# Patient Record
Sex: FEMALE | Race: WHITE | URBAN - METROPOLITAN AREA
[De-identification: names, ages, dates, MRNs, and addresses within clinical notes are randomized per-mention and may not be internally consistent; named-entity substitution may affect disease eponyms.]

---

## 2021-04-09 ENCOUNTER — WEB ENCOUNTER (OUTPATIENT)
Dept: URBAN - METROPOLITAN AREA CLINIC 96 | Facility: CLINIC | Age: 61
End: 2021-04-09

## 2021-04-09 ENCOUNTER — OFFICE VISIT (OUTPATIENT)
Dept: URBAN - METROPOLITAN AREA CLINIC 96 | Facility: CLINIC | Age: 61
End: 2021-04-09
Payer: COMMERCIAL

## 2021-04-09 DIAGNOSIS — R10.84 GENERALIZED ABDOMINAL PAIN: ICD-10-CM

## 2021-04-09 DIAGNOSIS — R07.89 CHEST DISCOMFORT: ICD-10-CM

## 2021-04-09 DIAGNOSIS — R12 HEARTBURN: ICD-10-CM

## 2021-04-09 DIAGNOSIS — R14.2 ERUCTATION: ICD-10-CM

## 2021-04-09 PROBLEM — 73595000: Status: ACTIVE | Noted: 2021-04-09

## 2021-04-09 PROCEDURE — 99244 OFF/OP CNSLTJ NEW/EST MOD 40: CPT | Performed by: INTERNAL MEDICINE

## 2021-04-09 RX ORDER — PANTOPRAZOLE SODIUM 40 MG/1
1 TABLET TABLET, DELAYED RELEASE ORAL ONCE A DAY
Status: ACTIVE | COMMUNITY

## 2021-04-09 RX ORDER — SODIUM, POTASSIUM,MAG SULFATES 17.5-3.13G
177 ML SOLUTION, RECONSTITUTED, ORAL ORAL AS DIRECTED
Qty: 1 BOX | Refills: 0 | OUTPATIENT
Start: 2021-04-09 | End: 2021-04-10

## 2021-04-09 NOTE — HPI-TODAY'S VISIT:
The patient was referred by Dr. Bartolo Burrell on [facility] for chest discomfort.   A copy of this document is being forwarded to the referring provider.  60 y.o. 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 Burrell about intermittent epigastric pain that comes and goes and radiates into ribs and to the back Bowels are fine No blood in stool Wt is down - went down last March and April - has tendency to lose wt easily No emesis; did have a few AMs w/ nausea Exercise and some weights - doesn't effect syx Heartburn feeling from food Has to eat slowly and chew food really well FHX of EOE in sister and nephew

## 2021-04-10 LAB
A/G RATIO: 1.7
ALBUMIN: 4.7
ALKALINE PHOSPHATASE: 66
ALT (SGPT): 10
AST (SGOT): 19
BASO (ABSOLUTE): 0
BASOS: 0
BILIRUBIN, TOTAL: 0.2
BUN/CREATININE RATIO: 16
BUN: 10
CALCIUM: 9.6
CARBON DIOXIDE, TOTAL: 26
CHLORIDE: 99
CREATININE: 0.64
EGFR IF AFRICN AM: 111
EGFR IF NONAFRICN AM: 97
EOS (ABSOLUTE): 0
EOS: 0
GLOBULIN, TOTAL: 2.8
GLUCOSE: 130
HEMATOCRIT: 40.9
HEMATOLOGY COMMENTS:: (no result)
HEMOGLOBIN: 13.5
IMMATURE CELLS: (no result)
IMMATURE GRANS (ABS): 0
IMMATURE GRANULOCYTES: 0
LIPASE: 38
LYMPHS (ABSOLUTE): 1.3
LYMPHS: 17
MCH: 29.9
MCHC: 33
MCV: 91
MONOCYTES(ABSOLUTE): 0.7
MONOCYTES: 9
NEUTROPHILS (ABSOLUTE): 5.8
NEUTROPHILS: 74
NRBC: (no result)
PLATELETS: 267
POTASSIUM: 4.2
PROTEIN, TOTAL: 7.5
RBC: 4.52
RDW: 13.1
SODIUM: 139
WBC: 7.9

## 2021-04-23 ENCOUNTER — TELEPHONE ENCOUNTER (OUTPATIENT)
Dept: URBAN - METROPOLITAN AREA CLINIC 23 | Facility: CLINIC | Age: 61
End: 2021-04-23

## 2021-05-10 ENCOUNTER — TELEPHONE ENCOUNTER (OUTPATIENT)
Dept: URBAN - METROPOLITAN AREA CLINIC 96 | Facility: CLINIC | Age: 61
End: 2021-05-10

## 2021-05-10 PROBLEM — 193190005: Status: ACTIVE | Noted: 2021-05-10

## 2021-05-12 ENCOUNTER — OFFICE VISIT (OUTPATIENT)
Dept: URBAN - METROPOLITAN AREA SURGERY CENTER 18 | Facility: SURGERY CENTER | Age: 61
End: 2021-05-12

## 2021-05-19 ENCOUNTER — DASHBOARD ENCOUNTERS (OUTPATIENT)
Age: 61
End: 2021-05-19

## 2021-06-07 ENCOUNTER — OFFICE VISIT (OUTPATIENT)
Dept: URBAN - METROPOLITAN AREA SURGERY CENTER 18 | Facility: SURGERY CENTER | Age: 61
End: 2021-06-07